# Patient Record
Sex: FEMALE | Race: ASIAN
[De-identification: names, ages, dates, MRNs, and addresses within clinical notes are randomized per-mention and may not be internally consistent; named-entity substitution may affect disease eponyms.]

---

## 2019-03-22 ENCOUNTER — HOSPITAL ENCOUNTER (INPATIENT)
Dept: HOSPITAL 93 - ER | Age: 29
LOS: 6 days | Discharge: HOME | DRG: 689 | End: 2019-03-28
Attending: INTERNAL MEDICINE | Admitting: INTERNAL MEDICINE
Payer: COMMERCIAL

## 2019-03-22 VITALS — WEIGHT: 110 LBS | HEIGHT: 65 IN | BODY MASS INDEX: 18.33 KG/M2

## 2019-03-22 DIAGNOSIS — N12: Primary | ICD-10-CM

## 2019-03-22 DIAGNOSIS — A41.89: ICD-10-CM

## 2019-03-22 DIAGNOSIS — I95.89: ICD-10-CM

## 2019-03-22 DIAGNOSIS — E86.0: ICD-10-CM

## 2019-03-22 DIAGNOSIS — N39.0: ICD-10-CM

## 2019-03-22 NOTE — NUR
SE RECIBE FEMINA ALERTA Y ORIENTADA POR VLADIMIR ESFERAS, EN AMBULANCIA. REFIERE
QUE PRESENTA DOLOR ABDOMINAL EN CUADRANTE SUPERIOR DERECHO DE CUATRO BEYER DE
EVOLUCION.

## 2019-03-22 NOTE — NUR
. MARTE ORIENTA A PACIENTE SOBRE TRATAMIENTO. GÓMEZ MUESTRAS DE
LABORAORIO ORDENADAS. CANALIZA CON AREA DE VENOPUNCION AZEEM DE EDEMA O
ENROJECIMIENTO. ADMINISTRA MEDICAMENTOSN ORDENADOS. SE MANTIENE EN OBSERVACION
POR CAMBIOS.

## 2019-03-23 PROCEDURE — BW21ZZZ COMPUTERIZED TOMOGRAPHY (CT SCAN) OF ABDOMEN AND PELVIS: ICD-10-PCS | Performed by: INTERNAL MEDICINE

## 2019-03-23 NOTE — NUR
SE RECIBE PTE FEMENIAN DE 28 YRS ALERTA CONCIENTE Y TRANQUILA EN COMPANIA DE
FAMILIAR. PTE CON IVF PANTENTE Y AZEEM DE EDEDMA. PT AL MOMENTO AZEEM DE DOLOR.
SE OBSERVA POR MEDICO CONSULTOR.SE MANTIENE BAJO OBSERVACION POR CAMBIOS.

## 2019-03-23 NOTE — NUR
PACIENTE ALERTA Y ORIENTADA POR VLADIMIR ESFERAS EN CHAPO CON BARANDAS ELEVADAS
POR MIRAMONTES SEGURIDAD. SE OBSERVA A PACIENTE CON BUEN PATRON RESPIRATORIO Y PIEL
TIBIA AL TACTO. SE OBSERVA .9NSS @ 125ML/HR. SE ORIENTA A PACIENTE SOBRE
CONTRASTE PARA CT ABD/PELV.